# Patient Record
Sex: MALE | Race: AMERICAN INDIAN OR ALASKA NATIVE
[De-identification: names, ages, dates, MRNs, and addresses within clinical notes are randomized per-mention and may not be internally consistent; named-entity substitution may affect disease eponyms.]

---

## 2019-10-11 ENCOUNTER — HOSPITAL ENCOUNTER (EMERGENCY)
Dept: HOSPITAL 5 - ED | Age: 49
LOS: 1 days | Discharge: HOME | End: 2019-10-12
Payer: MEDICAID

## 2019-10-11 DIAGNOSIS — R21: ICD-10-CM

## 2019-10-11 DIAGNOSIS — M54.9: ICD-10-CM

## 2019-10-11 DIAGNOSIS — R07.89: ICD-10-CM

## 2019-10-11 DIAGNOSIS — M19.90: ICD-10-CM

## 2019-10-11 DIAGNOSIS — G89.29: Primary | ICD-10-CM

## 2019-10-11 PROCEDURE — 71046 X-RAY EXAM CHEST 2 VIEWS: CPT

## 2019-10-11 PROCEDURE — 82550 ASSAY OF CK (CPK): CPT

## 2019-10-11 PROCEDURE — 80053 COMPREHEN METABOLIC PANEL: CPT

## 2019-10-11 PROCEDURE — 83735 ASSAY OF MAGNESIUM: CPT

## 2019-10-11 PROCEDURE — 36415 COLL VENOUS BLD VENIPUNCTURE: CPT

## 2019-10-11 PROCEDURE — 93005 ELECTROCARDIOGRAM TRACING: CPT

## 2019-10-11 PROCEDURE — 84484 ASSAY OF TROPONIN QUANT: CPT

## 2019-10-11 PROCEDURE — 93010 ELECTROCARDIOGRAM REPORT: CPT

## 2019-10-11 PROCEDURE — 85025 COMPLETE CBC W/AUTO DIFF WBC: CPT

## 2019-10-12 VITALS — DIASTOLIC BLOOD PRESSURE: 87 MMHG | SYSTOLIC BLOOD PRESSURE: 135 MMHG

## 2019-10-12 LAB
ALBUMIN SERPL-MCNC: 4.2 G/DL (ref 3.9–5)
ALT SERPL-CCNC: 18 UNITS/L (ref 7–56)
BASOPHILS # (AUTO): 0.1 K/MM3 (ref 0–0.1)
BASOPHILS NFR BLD AUTO: 1 % (ref 0–1.8)
BUN SERPL-MCNC: 16 MG/DL (ref 9–20)
BUN/CREAT SERPL: 16 %
CALCIUM SERPL-MCNC: 8.5 MG/DL (ref 8.4–10.2)
EOSINOPHIL # BLD AUTO: 0.4 K/MM3 (ref 0–0.4)
EOSINOPHIL NFR BLD AUTO: 6.9 % (ref 0–4.3)
HCT VFR BLD CALC: 42.8 % (ref 35.5–45.6)
HEMOLYSIS INDEX: 7
HGB BLD-MCNC: 14.2 GM/DL (ref 11.8–15.2)
LYMPHOCYTES # BLD AUTO: 1.7 K/MM3 (ref 1.2–5.4)
LYMPHOCYTES NFR BLD AUTO: 32.7 % (ref 13.4–35)
MCHC RBC AUTO-ENTMCNC: 33 % (ref 32–34)
MCV RBC AUTO: 91 FL (ref 84–94)
MONOCYTES # (AUTO): 0.4 K/MM3 (ref 0–0.8)
MONOCYTES % (AUTO): 8.2 % (ref 0–7.3)
PLATELET # BLD: 175 K/MM3 (ref 140–440)
RBC # BLD AUTO: 4.71 M/MM3 (ref 3.65–5.03)

## 2019-10-12 NOTE — EMERGENCY DEPARTMENT REPORT
ED Chest Pain HPI





- General


Chief Complaint: Chest Pain


Stated Complaint: CHEST PAIN,BODYACHES


Time Seen by Provider: 10/11/19 23:48


Source: patient, RN notes reviewed


Mode of arrival: Ambulatory


Limitations: No Limitations





- History of Present Illness


Initial Comments: 





This is a 49-year-old gentleman.  The patient is not known to this provider 

previously





Primary care Dr.: Dr. Barker





Pain physician, Dr. Grimes





Past medical history: Narcotic dependence, DJD, Osgood-Schlatter, chronic back 

pain





The patient presents to the ER with a complaint of 3 days acute on chronic back 

pain.  The back pain is sharp and throbbing and does not radiate anywhere.  He 

states the pain is sometimes so severe, that he has bilateral chest wall pain.  

The chest wall pain does not radiate to the neck or arms.  There is no vomiting 

or diaphoresis.  There is no exertional shortness of breath.  There is no recent

aspirin consumption.  The patient denies DVT, pulmonary embolus risk factors.





He endorses a tertiary complaint of subjective rash on his bilateral feet which 

is nonpruritic, and he believes that he may have poison ivy.


MD Complaint: chest pain, other


-: days(s)


Pain Location: epigastric


Pain Radiation: none


Severity: moderate


Quality: aching


Consistency: intermittent


Improves With: nothing


Worsens With: nothing


Aspirin use within the Past 7 Days: (0) No





- Related Data


                                    Allergies











Allergy/AdvReac Type Severity Reaction Status Date / Time


 


No Known Allergies Allergy   Verified 10/12/19 00:48














Heart Score





- HEART Score


History: Slightly suspicious


EKG: Non-specific


Age: 45-65


Risk factors: No known risk factors


Troponin: < normal limit


HEART Score: 2





- Critical Actions


Critical Actions: 0-3 pts:0.9-1.7%risk of adverse cardiac event.Candidate for 

discharge





ED Review of Systems


ROS: 


Stated complaint: CHEST PAIN,BODYACHES


Other details as noted in HPI





Constitutional: denies: fever


Eyes: denies: eye discharge


ENT: denies: epistaxis


Respiratory: denies: cough


Cardiovascular: chest pain


Gastrointestinal: denies: vomiting


Musculoskeletal: back pain, arthralgia, myalgia


Skin: lesions


Neurological: denies: weakness





ED Past Medical Hx





- Past Medical History


Hx Arthritis: Yes


Additional medical history: Degenerative disc disease





- Social History


Smoking Status: Unknown if ever smoked


Substance Use Type: None





ED Physical Exam





- General


Limitations: No Limitations


General appearance: alert, in no apparent distress





- Head


Head exam: Present: atraumatic, normocephalic





- Eye


Eye exam: Present: normal appearance, EOMI.  Absent: nystagmus





- ENT


ENT exam: Present: normal exam, normal orophraynx, mucous membranes moist, 

normal external ear exam





- Neck


Neck exam: Present: normal inspection, full ROM.  Absent: tenderness, 

meningismus





- Respiratory


Respiratory exam: Present: normal lung sounds bilaterally.  Absent: respiratory 

distress





- Cardiovascular


Cardiovascular Exam: Present: regular rate, normal rhythm, normal heart sounds. 

Absent: bradycardia, tachycardia, irregular rhythm, systolic murmur, diastolic 

murmur, rubs, gallop





- GI/Abdominal


GI/Abdominal exam: Present: soft.  Absent: distended, tenderness, guarding, 

rebound, rigid, pulsatile mass





- Rectal


Rectal exam: Present: deferred





- Extremities Exam


Extremities exam: Present: normal inspection, full ROM, other (2+ pulses noted 

in the bilateral upper, lower extremities.  Compartments soft.  No long bony 

tenderness.  The pelvis is stable.).  Absent: pedal edema, joint swelling, calf 

tenderness





- Back Exam


Back exam: Present: normal inspection, full ROM.  Absent: tenderness, CVA 

tenderness (R), CVA tenderness (L), paraspinal tenderness, vertebral tenderness





- Neurological Exam


Neurological exam: Present: alert, oriented X3, other (Extraocular movements 

intact.  Tongue midline.  No facial droop.  Facial sensation intact to light 

touch in the V1, V2, V3 distribution bilaterally.  5 and 5 strength in 4 

extremities..  Sensation is intact to light touch in 4 extremities.)





- Psychiatric


Psychiatric exam: Present: flat affect





- Skin


Skin exam: Present: warm, dry, intact, normal color.  Absent: rash





ED Course


                                   Vital Signs











  10/11/19 10/12/19





  22:28 01:12


 


Temperature 98.0 F 


 


Pulse Rate 81 73


 


Respiratory 18 16





Rate  


 


Blood Pressure 129/83 


 


Blood Pressure  135/87





[Right]  


 


O2 Sat by Pulse 96 98





Oximetry  














- Reevaluation(s)


Reevaluation #1: 





10/12/19 00:10


[facility]








Filled   ID   Written   Drug   QTY   Days   Prescriber   Rx #   Pharmacy *   

Refills   Daily Dose   Pymt Type   


10/02/2019   1   10/01/2019   


OXYCODONE HCL 20 MG TABLET


84.0   21   [place] O[place]   [id number]   [facility] (4169)   0   120.0 M[place]   [place]


10/02/2019   1   10/01/2019   


MEICGS-JFNFMBNX-CHSI -40


21.0   3   [place]   [id number]   [facility] (4169[place]   [place]      [place]


10/02/2019   1   10/01/2019   


ZOLPIDEM TARTRATE 10 MG TABLET


16.0   22   [place]   [id number]   [facility] (4169[place]   [place]      [place]


09/11/2019   1   09/11/2019   


OXYCODONE HCL 20 MG TABLET


84.0   21   [place]   [id number]   [facility] (4169)   0   120.0 MME   [place]


09/11/2019   1   09/11/2019   


KFHBRA-VDELTTFX-IRVA -40


21.0   3   [place]   [id number]   [facility] (4169)   [place]      [place]


08/21/2019   1   08/20/2019   


OXYCODONE HCL 20 MG TABLET


84.0   21   [place]   191402   [facility] (4169)   0   120.0 MME   [place]


08/21/2019   1   08/20/2019   


NRDDGC-LKDVKJOA-WLQM -40


21.0   3   [place]   [id number]   [facility] (4169)   [place]      [place]


07/31/2019   1   07/30/2019   


OXYCODONE HCL 20 MG TABLET


84.0   21   [place]   [id number]   [facility] (7619)   0   120.0 MME   [place]


07/31/2019   1   07/31/2019   


EZTTAT-YTLNRFIK-GTMK -40


21.0   3   [place] KRO   [id number]   [facility] (4169)   0      [place]


07/10/2019   1   07/09/2019   


OXYCODONE HCL 20 MG TABLET


84.0   21   [place]   359261   [facility] (4169)   0   120.0 MME   [place]


07/10/2019   1   07/09/2019   


MKQFQF-RBRDCRFW-VNXE -40


21.0   3   [place]   258065   [facility] (4169)   0      [place]


06/19/2019   1   06/18/2019   


OXYCODONE HCL 20 MG TABLET


84.0   21   [place]   970450   [facility] (4169)   0   120.0 MME   [place]


06/19/2019   1   06/18/2019   


QIDRDT-ZPTXBQYI-OASV -40


21.0   3   [place]   729098   [facility] (4169)   0      [place]


05/29/2019   1   05/28/2019   


OXYCODONE HCL 20 MG TABLET


84.0   21   [place]   [id number]   [facility] (4169)   0   120.0 MME   [place]


05/29/2019   1   05/28/2019   


JKOKJU-AHIDOGZQ-EYXV -40


21.0   3   [place]   061762   [facility] (4169)   0      [place]


05/08/2019   1   05/08/2019   


OXYCODONE HCL 20 MG TABLET


84.0   21   [place]   534406   [facility] (4169)   0   120.0 MME   [place]


05/08/2019   1   05/08/2019   


WIQMNJ-QWLOJIPE-ZWOD -40


21.0   3   [place]   313346   [facility] (4169)   0      [place]


04/17/2019   1   04/16/2019   


OXYCODONE HCL 20 MG TABLET


84.0   21   [place]   019223   [facility] (4169)   0   120.0 MME   [place]


04/17/2019   1   04/16/2019   


XKAXDN-EBHQATXK-GKLK -40


21.0   3   [place]   156823   [facility] (4169)   0      [place]


03/27/2019   1   03/27/2019   


OXYCODONE HCL 20 MG TABLET


84.0   21   [place]   467302   [facility] (4169)   0   120.0 MME   [place]


03/27/2019   1   03/27/2019   


GCFIGP-QWBWKJMI-IYZF -40


21.0   3   [place]   [id number]   [facility] (4169)   0      [place]


03/06/2019   1   03/04/2019   


OXYCODONE HCL 20 MG TABLET


84.0   21   [place]   077851   [facility] (4169)   0   120.0 MME   [place]


03/06/2019   1   03/04/2019   


SUUPWP-QSHRQQEA-IXQD -40


21.0   3   [place]   818300   [facility] (4169)   0      [place]


02/13/2019   1   02/11/2019   


OXYCODONE HCL 20 MG TABLET


84.0   21   [place]   025277   [facility] (4169)   0   120.0 MME   [place]


02/13/2019   1   02/11/2019   


RMHSZC-BBQOLHOQ-EDEZ -40


21.0   3   [place]   782044   [facility] (4169)   0      [place]


01/23/2019   1   01/21/2019   


OXYCODONE HCL 20 MG TABLET


84.0   21   [place]   183907   [facility] (4169)   0   120.0 MME   [place]


01/23/2019   1   01/21/2019   


YYVZXJ-CHSBHBYU-XEAT -40


21.0   7   [place]   373924   [facility] (4169)   0      [place]


01/02/2019   1   12/28/2018   


OXYCODONE HCL 20 MG TABLET


84.0   21   [place] PREET   [id number]   [facility] (4169)   0   120.0 MME   [place]





KALEB score





- Kaleb Score


Age > 65: (0) No


Aspirin use within the Past 7 Days: (0) No


3 or more CAD Risk Factors: (0) No


2 or more Angina events in past 24 hrs: (0) No


Known CAD with more than 50% Stenosis: (0) No


Elevated Cardiac Markers: (0) No


ST Deviation Greater than 0.5mm: (0) No


KALEB Score: 0





ED Medical Decision Making





- Lab Data


Result diagrams: 


                                 10/11/19 23:49





                                 10/11/19 23:49








                                   Vital Signs











  10/11/19





  22:28


 


Temperature 98.0 F


 


Pulse Rate 81


 


Respiratory 18





Rate 


 


Blood Pressure 129/83


 


O2 Sat by Pulse 96





Oximetry 











                                   Lab Results











  10/11/19 10/11/19 10/12/19 Range/Units





  23:49 23:49 00:20 


 


WBC  5.2    (4.5-11.0)  K/mm3


 


RBC  4.71    (3.65-5.03)  M/mm3


 


Hgb  14.2    (11.8-15.2)  gm/dl


 


Hct  42.8    (35.5-45.6)  %


 


MCV  91    (84-94)  fl


 


MCH  30    (28-32)  pg


 


MCHC  33    (32-34)  %


 


RDW  14.4    (13.2-15.2)  %


 


Plt Count  175    (140-440)  K/mm3


 


Lymph % (Auto)  32.7    (13.4-35.0)  %


 


Mono % (Auto)  8.2 H    (0.0-7.3)  %


 


Eos % (Auto)  6.9 H    (0.0-4.3)  %


 


Baso % (Auto)  1.0    (0.0-1.8)  %


 


Lymph #  1.7    (1.2-5.4)  K/mm3


 


Mono #  0.4    (0.0-0.8)  K/mm3


 


Eos #  0.4    (0.0-0.4)  K/mm3


 


Baso #  0.1    (0.0-0.1)  K/mm3


 


Seg Neutrophils %  51.2    (40.0-70.0)  %


 


Seg Neutrophils #  2.6    (1.8-7.7)  K/mm3


 


Sodium   139   (137-145)  mmol/L


 


Potassium   3.9   (3.6-5.0)  mmol/L


 


Chloride   104.7   ()  mmol/L


 


Carbon Dioxide   23   (22-30)  mmol/L


 


Anion Gap   15   mmol/L


 


BUN   16   (9-20)  mg/dL


 


Creatinine   1.0   (0.8-1.5)  mg/dL


 


Estimated GFR   > 60   ml/min


 


BUN/Creatinine Ratio   16   %


 


Glucose   122 H   ()  mg/dL


 


Calcium   8.5   (8.4-10.2)  mg/dL


 


Magnesium    1.60 L  (1.7-2.3)  mg/dL


 


Total Bilirubin   < 0.20   (0.1-1.2)  mg/dL


 


AST   18   (5-40)  units/L


 


ALT   18   (7-56)  units/L


 


Alkaline Phosphatase   68   ()  units/L


 


Total Creatine Kinase    202 H  ()  units/L


 


Total Protein   7.5   (6.3-8.2)  g/dL


 


Albumin   4.2   (3.9-5)  g/dL


 


Albumin/Globulin Ratio   1.3   %














- EKG Data


-: EKG Interpreted by Me


EKG shows normal: sinus rhythm


Rate: normal





- EKG Data





10/12/19 00:44


There is no prior EKG available for comparison.  EKG #1 demonstrates normal 

sinus, 65 beats minute, borderline rightward axis deviation, negatively defle

cted QRS aVL,  ms, the EKG is not consistent with ST elevation myocardial

 infarction.





EKG #2 shows a normal axis, isoelectric aVL QRS complex, otherwise unremarkable.

  Suspect initial EKG had lead reversal.





Neither EKG is consistent with ST elevation myocardial infarction.





- Radiology Data


Radiology results: report reviewed, image reviewed





X-ray the chest is negative for acute disease





- Medical Decision Making





Differential diagnosis, including not limited to: Chronic back pain, DJD, 

radiculopathy, GERD, gastritis, hiatal hernia, pneumonia, acute coronary 

syndrome





Assessment and plan: 49-year-old gentleman with a primary complaint of back 

pain, secondary complaints of chest pain, tertiary complaint of rash





Complaint #1, back pain: As per review of [place] prescription monitoring 

database, patient on multiple controlled substances.  He is moving 4 extremities

 spontaneously, neurovascularly intact, does not demonstrate any historical or 

physical exam features suggestive of epidural compression or AAA.  He'll need to

 follow up with his private pain specialist for his chronic pain





Complaint #2, chest pain.  Chest discomfort present for 72 hours.  Troponin 

negative 1, EKG essentially unchanged 2.  Given pain of multiple days, and 

symptoms greater than 8 hours, as per the [employer]

sicians clinical policy, myocardial infarction may be excluded with 1 set of 

cardiac enzymes.  Equal pulses in upper, lower extremities, not hypertensive, x-

ray of the chest unremarkable, this is unlikely to be aortic disease.  Not 

tachycardic, not hypoxic, noted DVT or pulmonary embolus risk factors, low risk 

by well's criteria, this is very unlikely to be thrombolic disease or pulmonary 

embolism.





Complaint #3, rash on his lower extremities.  On my examination, I do not 

appreciate any rash.  Cutaneous exam is unremarkable.





The patient does not appear to have an emergent medical condition at this time. 

 He can follow-up as an outpatient primary care doctor, pain specialist and 

cardiologist.  As per this institution's protocol and policy, he will be 

referred to our outpatient cardiology group for expedited risk stratification.





His information will be sent to the [facility] [place] cardiology group.


Critical care attestation.: 


If time is entered above; I have spent that time in minutes in the direct care 

of this critically ill patient, excluding procedure time.








ED Disposition


Clinical Impression: 


 Chronic back pain, History of chest pain





Disposition: DC-01 TO HOME OR SELFCARE


Is pt being admited?: No


Does the pt Need Aspirin: No


Condition: Stable


Additional Instructions: 


Continue outpatient medications.  Patient may take Pepcid, over-the-counter, 20 

mg daily, and may take aspirin, over-the-counter, 81 mg daily.  Recommend 

follow-up with an outpatient cardiologist within the next 3-5 days.  Avoid 

consumption of Motrin, ibuprofen, Naprosyn, Aleve, heavy, spicy foods.  Return 

to the emergency room right away with new, worsening or different symptoms, not 

present on initial emergency room evaluation.  Rest, avoid heavy lifting, and 

avoid strenuous physical activities.


Referrals: 


[facility] [Provider Group] - 3-5 Days

## 2019-10-12 NOTE — XRAY REPORT
CHEST 2 VIEWS 



INDICATION / CLINICAL INFORMATION:

Chest Pain.



COMPARISON: 

None available.



FINDINGS:



SUPPORT DEVICES: None.

HEART / MEDIASTINUM: Mild enlargement of the cardiac silhouette. 

LUNGS / PLEURA: No significant pulmonary or pleural abnormality. No pneumothorax. 



ADDITIONAL FINDINGS: No significant additional findings.



IMPRESSION:

1. No acute findings.



Signer Name: Alvarado Hernandze MD 

Signed: 10/11/2019 11:55 PM

 Workstation Name: [id number]